# Patient Record
Sex: MALE | Race: WHITE | URBAN - METROPOLITAN AREA
[De-identification: names, ages, dates, MRNs, and addresses within clinical notes are randomized per-mention and may not be internally consistent; named-entity substitution may affect disease eponyms.]

---

## 2024-03-27 ENCOUNTER — APPOINTMENT (RX ONLY)
Dept: URBAN - METROPOLITAN AREA CLINIC 71 | Facility: CLINIC | Age: 44
Setting detail: DERMATOLOGY
End: 2024-03-27

## 2024-03-27 DIAGNOSIS — L82.0 INFLAMED SEBORRHEIC KERATOSIS: ICD-10-CM

## 2024-03-27 DIAGNOSIS — D22 MELANOCYTIC NEVI: ICD-10-CM

## 2024-03-27 DIAGNOSIS — L82.1 OTHER SEBORRHEIC KERATOSIS: ICD-10-CM

## 2024-03-27 DIAGNOSIS — D18.0 HEMANGIOMA: ICD-10-CM

## 2024-03-27 PROBLEM — D22.5 MELANOCYTIC NEVI OF TRUNK: Status: ACTIVE | Noted: 2024-03-27

## 2024-03-27 PROBLEM — D18.01 HEMANGIOMA OF SKIN AND SUBCUTANEOUS TISSUE: Status: ACTIVE | Noted: 2024-03-27

## 2024-03-27 PROBLEM — D22.39 MELANOCYTIC NEVI OF OTHER PARTS OF FACE: Status: ACTIVE | Noted: 2024-03-27

## 2024-03-27 PROCEDURE — ? LIQUID NITROGEN

## 2024-03-27 PROCEDURE — 99202 OFFICE O/P NEW SF 15 MIN: CPT | Mod: 25

## 2024-03-27 PROCEDURE — 17110 DESTRUCTION B9 LES UP TO 14: CPT

## 2024-03-27 PROCEDURE — ? COUNSELING

## 2024-03-27 ASSESSMENT — LOCATION DETAILED DESCRIPTION DERM
LOCATION DETAILED: LEFT LATERAL FOREHEAD
LOCATION DETAILED: LEFT SUPERIOR POSTERIOR HELIX
LOCATION DETAILED: RIGHT SUPERIOR MEDIAL LOWER BACK
LOCATION DETAILED: RIGHT MID TEMPLE
LOCATION DETAILED: RIGHT CENTRAL MALAR CHEEK

## 2024-03-27 ASSESSMENT — LOCATION ZONE DERM
LOCATION ZONE: TRUNK
LOCATION ZONE: EAR
LOCATION ZONE: FACE

## 2024-03-27 ASSESSMENT — LOCATION SIMPLE DESCRIPTION DERM
LOCATION SIMPLE: RIGHT LOWER BACK
LOCATION SIMPLE: RIGHT CHEEK
LOCATION SIMPLE: RIGHT TEMPLE
LOCATION SIMPLE: LEFT EAR
LOCATION SIMPLE: LEFT FOREHEAD

## 2024-03-27 NOTE — PROCEDURE: LIQUID NITROGEN
Number Of Freeze-Thaw Cycles: 1 freeze-thaw cycle
Render Note In Bullet Format When Appropriate: No
Show Aperture Variable?: Yes
Medical Necessity Clause: This procedure was medically necessary because the lesions that were treated were: acutely irritated.
Medical Necessity Information: It is in your best interest to select a reason for this procedure from the list below. All of these items fulfill various CMS LCD requirements except the new and changing color options.
Post-Care Instructions: I reviewed with the patient in detail post-care instructions. Patient is to wear sunprotection, and avoid picking at any of the treated lesions. Pt may apply Vaseline to crusted or scabbing areas.
Detail Level: Detailed
Spray Paint Text: The liquid nitrogen was applied to the skin utilizing a spray paint frosting technique.
Duration Of Freeze Thaw-Cycle (Seconds): 10-15
Consent: The patient's consent was obtained including but not limited to risks of crusting, scabbing, blistering, scarring, darker or lighter pigmentary change, recurrence, incomplete removal and infection.